# Patient Record
Sex: FEMALE | Race: BLACK OR AFRICAN AMERICAN
[De-identification: names, ages, dates, MRNs, and addresses within clinical notes are randomized per-mention and may not be internally consistent; named-entity substitution may affect disease eponyms.]

---

## 2021-04-27 PROBLEM — Z00.00 ENCOUNTER FOR PREVENTIVE HEALTH EXAMINATION: Status: ACTIVE | Noted: 2021-04-27

## 2021-04-28 ENCOUNTER — APPOINTMENT (OUTPATIENT)
Dept: PHYSICAL MEDICINE AND REHAB | Facility: CLINIC | Age: 36
End: 2021-04-28
Payer: COMMERCIAL

## 2021-04-28 DIAGNOSIS — M23.303 OTHER MENISCUS DERANGEMENTS, UNSPECIFIED MEDIAL MENISCUS, RIGHT KNEE: ICD-10-CM

## 2021-04-28 DIAGNOSIS — Z78.9 OTHER SPECIFIED HEALTH STATUS: ICD-10-CM

## 2021-04-28 DIAGNOSIS — Z72.3 LACK OF PHYSICAL EXERCISE: ICD-10-CM

## 2021-04-28 DIAGNOSIS — M17.11 UNILATERAL PRIMARY OSTEOARTHRITIS, RIGHT KNEE: ICD-10-CM

## 2021-04-28 DIAGNOSIS — M25.862 OTHER SPECIFIED JOINT DISORDERS, LEFT KNEE: ICD-10-CM

## 2021-04-28 PROCEDURE — 99204 OFFICE O/P NEW MOD 45 MIN: CPT

## 2021-04-28 PROCEDURE — 99072 ADDL SUPL MATRL&STAF TM PHE: CPT

## 2021-04-28 PROCEDURE — 73562 X-RAY EXAM OF KNEE 3: CPT | Mod: 50

## 2021-04-28 RX ORDER — IBUPROFEN 600 MG/1
600 TABLET, FILM COATED ORAL 3 TIMES DAILY
Qty: 80 | Refills: 0 | Status: ACTIVE | COMMUNITY
Start: 2021-04-28 | End: 1900-01-01

## 2021-04-28 RX ORDER — IBUPROFEN 600 MG/1
600 TABLET, FILM COATED ORAL
Refills: 0 | Status: ACTIVE | COMMUNITY

## 2021-04-28 NOTE — DATA REVIEWED
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  No gross fractures.

## 2021-04-28 NOTE — HISTORY OF PRESENT ILLNESS
[FreeTextEntry1] : Location: right medial knee\par Quality: sharp\par Severity: 5/10\par Duration: few wks\par Timin21, acute\par Context: atraumatic; went to urgent care and orthopedist\par Aggravating Factors: walking\par Alleviating Factors: ibuprofen, rest\par Associated Symptoms: denies weight loss, fever, chills, redness, warmth, weakness, numbness/tingling, +swelling\par Prior Studies: xray

## 2021-04-28 NOTE — ASSESSMENT
[FreeTextEntry1] : Records and xray reviewed from [facility].  \par \par Discussed tx options including aspiration, aspiration and injection.  Ice area often.  \par \par Keep using brace.

## 2021-04-28 NOTE — PHYSICAL EXAM
[FreeTextEntry1] : 35 year yo female in NAD and normal body habitus\par \par \par Gait - antalgic\par \par right KNEE:\par moderate swelling, no erythema, warmth\par flexion to 120 deg, ext normal\par no TTP in patellar and quad tendon, TTP in medial but not lateral joint\par 5/5 knee ext\par neg Lachman, +Aida\par